# Patient Record
Sex: FEMALE | Race: WHITE | NOT HISPANIC OR LATINO
[De-identification: names, ages, dates, MRNs, and addresses within clinical notes are randomized per-mention and may not be internally consistent; named-entity substitution may affect disease eponyms.]

---

## 2022-01-21 ENCOUNTER — DASHBOARD ENCOUNTERS (OUTPATIENT)
Age: 77
End: 2022-01-21

## 2022-01-21 ENCOUNTER — TELEPHONE ENCOUNTER (OUTPATIENT)
Dept: URBAN - METROPOLITAN AREA CLINIC 92 | Facility: CLINIC | Age: 77
End: 2022-01-21

## 2022-01-21 ENCOUNTER — WEB ENCOUNTER (OUTPATIENT)
Dept: URBAN - METROPOLITAN AREA CLINIC 92 | Facility: CLINIC | Age: 77
End: 2022-01-21

## 2022-01-21 ENCOUNTER — OFFICE VISIT (OUTPATIENT)
Dept: URBAN - METROPOLITAN AREA CLINIC 92 | Facility: CLINIC | Age: 77
End: 2022-01-21
Payer: MEDICARE

## 2022-01-21 VITALS
WEIGHT: 171 LBS | HEIGHT: 64 IN | HEART RATE: 64 BPM | DIASTOLIC BLOOD PRESSURE: 83 MMHG | SYSTOLIC BLOOD PRESSURE: 131 MMHG | BODY MASS INDEX: 29.19 KG/M2 | TEMPERATURE: 98.1 F

## 2022-01-21 DIAGNOSIS — R15.2 FECAL URGENCY: ICD-10-CM

## 2022-01-21 DIAGNOSIS — R15.9 FULL INCONTINENCE OF FECES: ICD-10-CM

## 2022-01-21 PROBLEM — 142251000119102: Status: ACTIVE | Noted: 2022-01-21

## 2022-01-21 PROBLEM — 71820002: Status: ACTIVE | Noted: 2022-01-21

## 2022-01-21 PROCEDURE — 99244 OFF/OP CNSLTJ NEW/EST MOD 40: CPT | Performed by: INTERNAL MEDICINE

## 2022-01-21 NOTE — HPI-TODAY'S VISIT:
76-year-old female with a history of endometrial cancer, status post radiation, chronic diarrhea, who presents to discuss diarrhea.  She has had several years of diarrhea, underwent colonoscopy for diarrhea in 2014 which demonstrated negative random biopsies.  She underwent radiation last year for her endometrial cancer, and this worsened her ability to control her bowels.  She no longer has any warning when she needs to defecate and this is caused significant social stress.  She takes 2 Imodium in the morning and 2 in the evening, because she thought that was as much as she can take.  Tried Metamucil, which did not help.  She has a history of 3 vaginal births.  No family history of colon cancer.  Her stools are mushy in consistency.  Patient referred by Dr. Sheets, and a copy of my note will be sent to him

## 2023-04-26 ENCOUNTER — OUT OF OFFICE VISIT (OUTPATIENT)
Dept: URBAN - METROPOLITAN AREA MEDICAL CENTER 12 | Facility: MEDICAL CENTER | Age: 78
End: 2023-04-26
Payer: MEDICARE

## 2023-04-26 DIAGNOSIS — Z79.01 ANTICOAGULANT LONG-TERM USE: ICD-10-CM

## 2023-04-26 DIAGNOSIS — R68.81 EARLY SATIETY: ICD-10-CM

## 2023-04-26 DIAGNOSIS — D63.0 ANEMIA IN NEOPLASTIC DISEASE: ICD-10-CM

## 2023-04-26 DIAGNOSIS — R19.7 ACUTE DIARRHEA: ICD-10-CM

## 2023-04-26 PROCEDURE — G8427 DOCREV CUR MEDS BY ELIG CLIN: HCPCS | Performed by: PHYSICIAN ASSISTANT

## 2023-04-26 PROCEDURE — 99221 1ST HOSP IP/OBS SF/LOW 40: CPT | Performed by: PHYSICIAN ASSISTANT
